# Patient Record
Sex: MALE
[De-identification: names, ages, dates, MRNs, and addresses within clinical notes are randomized per-mention and may not be internally consistent; named-entity substitution may affect disease eponyms.]

---

## 2020-01-27 ENCOUNTER — HOSPITAL ENCOUNTER (OUTPATIENT)
Dept: HOSPITAL 88 - OR | Age: 40
Discharge: HOME | End: 2020-01-27
Attending: INTERNAL MEDICINE
Payer: COMMERCIAL

## 2020-01-27 VITALS — SYSTOLIC BLOOD PRESSURE: 112 MMHG | DIASTOLIC BLOOD PRESSURE: 68 MMHG

## 2020-01-27 DIAGNOSIS — K29.80: ICD-10-CM

## 2020-01-27 DIAGNOSIS — K22.8: ICD-10-CM

## 2020-01-27 DIAGNOSIS — N20.0: ICD-10-CM

## 2020-01-27 DIAGNOSIS — K51.50: ICD-10-CM

## 2020-01-27 DIAGNOSIS — K63.5: ICD-10-CM

## 2020-01-27 DIAGNOSIS — K62.89: ICD-10-CM

## 2020-01-27 DIAGNOSIS — Z87.891: ICD-10-CM

## 2020-01-27 DIAGNOSIS — K76.0: ICD-10-CM

## 2020-01-27 DIAGNOSIS — K21.9: ICD-10-CM

## 2020-01-27 DIAGNOSIS — K31.4: ICD-10-CM

## 2020-01-27 DIAGNOSIS — K29.70: ICD-10-CM

## 2020-01-27 DIAGNOSIS — K57.92: Primary | ICD-10-CM

## 2020-01-27 DIAGNOSIS — K64.8: ICD-10-CM

## 2020-01-27 DIAGNOSIS — K22.10: ICD-10-CM

## 2020-01-27 PROCEDURE — 45384 COLONOSCOPY W/LESION REMOVAL: CPT

## 2020-01-27 PROCEDURE — 45380 COLONOSCOPY AND BIOPSY: CPT

## 2020-01-27 PROCEDURE — 45378 DIAGNOSTIC COLONOSCOPY: CPT

## 2020-01-27 PROCEDURE — 43239 EGD BIOPSY SINGLE/MULTIPLE: CPT

## 2020-01-27 NOTE — OPERATIVE REPORT
DATE OF PROCEDURE:  01/27/2020

 

SURGEON:  Garrick Her MD

 

PROCEDURES:  EGD with biopsies and colonoscopy with polypectomy and biopsies.

 

INDICATION FOR EGD:  Dyspepsia.

 

INDICATIONS FOR COLONOSCOPY:  Lower abdominal pain.

 

MEDICATIONS:  The patient was done under MAC.  Please see anesthesiologist's note.

 

PROCEDURE IN DETAIL:  With the patient in left lateral decubitus position, a flexible

fiberoptic Olympus gastroscope was introduced into the esophagus under direct

visualization without any difficulty.  Some focal nodularity was noted in the cervical

esophagus below and inlet patch and that was biopsied.  The scope was then advanced with

ease into the distal esophagus and some erosions were noted.  Also, there were some

tongues of velvety red mucosa were noted to extend proximally from the GE junction and

biopsies were obtained to rule out Esparza's.  The scope was then advanced with ease

into the stomach.  Ida was advanced with ease into the stomach.  Mucosa overlying

the antrum and the body revealed some patchy erythema and low-grade to moderate edema

and biopsies were obtained, sent to stain for H. pylori.  Pylorus was of normal contour

and shape, was intubated with ease and the scope was advanced all the way to the second

portion of the duodenum.  Biopsies were obtained from the proximal second portion and

duodenal bulb to rule out sprue.  The scope was then withdrawn back into the stomach and

retroflexed and the fundal diverticulum was noted.  The cardia appeared to be within

normal limits.  The scope was then straightened out.  It was subsequently withdrawn.

The patient tolerated the procedure well. 

 

IMPRESSION:  

1. Focal nodularity cervical esophagus, biopsied.

2. Erosive distal esophagitis.

3. Rule out Esparza esophagus.

4. Gastritis, biopsied.  Biopsies sent to stain for Helicobacter pylori.

5. Fundus diverticulum.

6. Rule out sprue.

 

PLAN:  Follow up histology.  Initiate Protonix 40 mg 1 p.o. q.a.m. a.c.  The patient was

then turned around after adequate lubrication of the anal canal.  A flexible fiberoptic

Olympus colonoscope was inserted into the rectum with ease and advanced all the way to

the cecum.  It was then withdrawn slowly.  Mucosa overlying the cecum appeared to be

within normal limits.  Ileocecal valve was intubated and scope was advanced into the

terminal ileum.  Biopsies were obtained.  The scope was then withdrawn back into the

colon.  The rest of the ascending appeared to be within normal limits.  In the distal

transverse colon, a minute polyp was noted that was removed per the hot biopsy forceps.

The rest of the transverse appeared to be within normal limits.  Mild patchy

inflammatory changes were noted in the left colon and the rectum and biopsies were

obtained.  Some minimal diverticulosis was noted in the sigmoid colon.  The scope was

then retroflexed into the distal rectum and small internal hemorrhoids were noted, none

of which was actively bleeding.  The scope was then straightened out.  It was

subsequently withdrawn.  The patient tolerated procedure well. 

 

IMPRESSION:  

1. Transverse colon polyp, hot biopsied.

2. Mild patchy left-sided colitis.

3. Diverticulosis, minimal.

4. Proctitis, mild.

5. Internal hemorrhoids, none actively bleeding.

 

PLAN:  Follow up histology.  Initiate Bentyl 10 mg one p.o. t.i.d. Start Visbiome one

p.o. b.i.d. 

 

The patient might benefit from a followup colonoscopy in 5 years.

 

 

 

 

______________________________

Garrick Her MD

 

[facility] [place]

D:  01/27/2020 14:49:25

T:  01/27/2020 22:01:42

Job #:  [id number]

 

cc:            Geovani Varela